# Patient Record
Sex: FEMALE | Race: OTHER | HISPANIC OR LATINO | URBAN - METROPOLITAN AREA
[De-identification: names, ages, dates, MRNs, and addresses within clinical notes are randomized per-mention and may not be internally consistent; named-entity substitution may affect disease eponyms.]

---

## 2024-07-11 ENCOUNTER — EMERGENCY (EMERGENCY)
Facility: HOSPITAL | Age: 29
LOS: 1 days | Discharge: HOME | End: 2024-07-11
Attending: EMERGENCY MEDICINE | Admitting: EMERGENCY MEDICINE
Payer: COMMERCIAL

## 2024-07-11 VITALS
OXYGEN SATURATION: 99 % | HEART RATE: 68 BPM | SYSTOLIC BLOOD PRESSURE: 115 MMHG | DIASTOLIC BLOOD PRESSURE: 68 MMHG | RESPIRATION RATE: 17 BRPM

## 2024-07-11 VITALS
TEMPERATURE: 98 F | OXYGEN SATURATION: 98 % | DIASTOLIC BLOOD PRESSURE: 85 MMHG | SYSTOLIC BLOOD PRESSURE: 137 MMHG | HEART RATE: 95 BPM | RESPIRATION RATE: 16 BRPM | WEIGHT: 169.98 LBS

## 2024-07-11 DIAGNOSIS — T78.1XXA OTHER ADVERSE FOOD REACTIONS, NOT ELSEWHERE CLASSIFIED, INITIAL ENCOUNTER: ICD-10-CM

## 2024-07-11 LAB — HCG UR QL: NEGATIVE — SIGNIFICANT CHANGE UP

## 2024-07-11 PROCEDURE — 99284 EMERGENCY DEPT VISIT MOD MDM: CPT

## 2024-07-11 RX ORDER — FAMOTIDINE 40 MG
20 TABLET ORAL ONCE
Refills: 0 | Status: COMPLETED | OUTPATIENT
Start: 2024-07-11 | End: 2024-07-11

## 2024-07-11 RX ORDER — DIPHENHYDRAMINE HCL 12.5MG/5ML
1 ELIXIR ORAL
Qty: 15 | Refills: 0
Start: 2024-07-11 | End: 2024-07-15

## 2024-07-11 RX ORDER — SODIUM CHLORIDE 0.9 % (FLUSH) 0.9 %
1000 SYRINGE (ML) INJECTION ONCE
Refills: 0 | Status: COMPLETED | OUTPATIENT
Start: 2024-07-11 | End: 2024-07-11

## 2024-07-11 RX ORDER — PREDNISONE 10 MG/1
2 TABLET ORAL
Qty: 8 | Refills: 0
Start: 2024-07-11 | End: 2024-07-14

## 2024-07-11 RX ADMIN — Medication 20 MILLIGRAM(S): at 15:54

## 2024-07-11 RX ADMIN — Medication 1000 MILLILITER(S): at 15:54

## 2024-07-18 ENCOUNTER — EMERGENCY (EMERGENCY)
Facility: HOSPITAL | Age: 29
LOS: 0 days | Discharge: HOME | End: 2024-07-19
Attending: EMERGENCY MEDICINE
Payer: COMMERCIAL

## 2024-07-18 VITALS
DIASTOLIC BLOOD PRESSURE: 72 MMHG | OXYGEN SATURATION: 100 % | HEART RATE: 85 BPM | SYSTOLIC BLOOD PRESSURE: 123 MMHG | RESPIRATION RATE: 18 BRPM

## 2024-07-18 VITALS
HEART RATE: 125 BPM | SYSTOLIC BLOOD PRESSURE: 122 MMHG | DIASTOLIC BLOOD PRESSURE: 76 MMHG | OXYGEN SATURATION: 99 % | RESPIRATION RATE: 17 BRPM | TEMPERATURE: 98 F

## 2024-07-18 DIAGNOSIS — F12.90 CANNABIS USE, UNSPECIFIED, UNCOMPLICATED: ICD-10-CM

## 2024-07-18 DIAGNOSIS — R11.2 NAUSEA WITH VOMITING, UNSPECIFIED: ICD-10-CM

## 2024-07-18 DIAGNOSIS — F41.9 ANXIETY DISORDER, UNSPECIFIED: ICD-10-CM

## 2024-07-18 DIAGNOSIS — R11.10 VOMITING, UNSPECIFIED: ICD-10-CM

## 2024-07-18 DIAGNOSIS — T40.715A ADVERSE EFFECT OF CANNABIS, INITIAL ENCOUNTER: ICD-10-CM

## 2024-07-18 LAB
ALBUMIN SERPL ELPH-MCNC: 4.7 G/DL — SIGNIFICANT CHANGE UP (ref 3.5–5.2)
ALP SERPL-CCNC: 66 U/L — SIGNIFICANT CHANGE UP (ref 30–115)
ALT FLD-CCNC: 12 U/L — SIGNIFICANT CHANGE UP (ref 0–41)
ANION GAP SERPL CALC-SCNC: 16 MMOL/L — HIGH (ref 7–14)
AST SERPL-CCNC: 12 U/L — SIGNIFICANT CHANGE UP (ref 0–41)
BILIRUB SERPL-MCNC: 0.5 MG/DL — SIGNIFICANT CHANGE UP (ref 0.2–1.2)
BUN SERPL-MCNC: 14 MG/DL — SIGNIFICANT CHANGE UP (ref 10–20)
CALCIUM SERPL-MCNC: 9.4 MG/DL — SIGNIFICANT CHANGE UP (ref 8.4–10.5)
CHLORIDE SERPL-SCNC: 101 MMOL/L — SIGNIFICANT CHANGE UP (ref 98–110)
CO2 SERPL-SCNC: 21 MMOL/L — SIGNIFICANT CHANGE UP (ref 17–32)
CREAT SERPL-MCNC: 0.8 MG/DL — SIGNIFICANT CHANGE UP (ref 0.7–1.5)
EGFR: 102 ML/MIN/1.73M2 — SIGNIFICANT CHANGE UP
GLUCOSE SERPL-MCNC: 134 MG/DL — HIGH (ref 70–99)
HCG SERPL QL: NEGATIVE — SIGNIFICANT CHANGE UP
HCT VFR BLD CALC: 42.3 % — SIGNIFICANT CHANGE UP (ref 37–47)
HGB BLD-MCNC: 14.2 G/DL — SIGNIFICANT CHANGE UP (ref 12–16)
LIDOCAIN IGE QN: 64 U/L — HIGH (ref 7–60)
MCHC RBC-ENTMCNC: 29.5 PG — SIGNIFICANT CHANGE UP (ref 27–31)
MCHC RBC-ENTMCNC: 33.6 G/DL — SIGNIFICANT CHANGE UP (ref 32–37)
MCV RBC AUTO: 87.8 FL — SIGNIFICANT CHANGE UP (ref 81–99)
PLATELET # BLD AUTO: 302 K/UL — SIGNIFICANT CHANGE UP (ref 130–400)
PMV BLD: 10.2 FL — SIGNIFICANT CHANGE UP (ref 7.4–10.4)
POTASSIUM SERPL-MCNC: 3.1 MMOL/L — LOW (ref 3.5–5)
POTASSIUM SERPL-SCNC: 3.1 MMOL/L — LOW (ref 3.5–5)
PROT SERPL-MCNC: 7.5 G/DL — SIGNIFICANT CHANGE UP (ref 6–8)
RBC # BLD: 4.82 M/UL — SIGNIFICANT CHANGE UP (ref 4.2–5.4)
RBC # FLD: 12.7 % — SIGNIFICANT CHANGE UP (ref 11.5–14.5)
SODIUM SERPL-SCNC: 138 MMOL/L — SIGNIFICANT CHANGE UP (ref 135–146)
WBC # BLD: 14.19 K/UL — HIGH (ref 4.8–10.8)
WBC # FLD AUTO: 14.19 K/UL — HIGH (ref 4.8–10.8)

## 2024-07-18 PROCEDURE — 85025 COMPLETE CBC W/AUTO DIFF WBC: CPT

## 2024-07-18 PROCEDURE — 36415 COLL VENOUS BLD VENIPUNCTURE: CPT

## 2024-07-18 PROCEDURE — 96374 THER/PROPH/DIAG INJ IV PUSH: CPT

## 2024-07-18 PROCEDURE — 83690 ASSAY OF LIPASE: CPT

## 2024-07-18 PROCEDURE — 84703 CHORIONIC GONADOTROPIN ASSAY: CPT

## 2024-07-18 PROCEDURE — 80053 COMPREHEN METABOLIC PANEL: CPT

## 2024-07-18 PROCEDURE — 96375 TX/PRO/DX INJ NEW DRUG ADDON: CPT

## 2024-07-18 PROCEDURE — 99284 EMERGENCY DEPT VISIT MOD MDM: CPT | Mod: 25

## 2024-07-18 PROCEDURE — 99284 EMERGENCY DEPT VISIT MOD MDM: CPT

## 2024-07-18 RX ORDER — SODIUM CHLORIDE 0.9 % (FLUSH) 0.9 %
1000 SYRINGE (ML) INJECTION ONCE
Refills: 0 | Status: COMPLETED | OUTPATIENT
Start: 2024-07-18 | End: 2024-07-18

## 2024-07-18 RX ORDER — FAMOTIDINE 40 MG
20 TABLET ORAL ONCE
Refills: 0 | Status: COMPLETED | OUTPATIENT
Start: 2024-07-18 | End: 2024-07-18

## 2024-07-18 RX ORDER — LORAZEPAM 0.5 MG
1 TABLET ORAL ONCE
Refills: 0 | Status: DISCONTINUED | OUTPATIENT
Start: 2024-07-18 | End: 2024-07-18

## 2024-07-18 RX ORDER — ONDANSETRON HYDROCHLORIDE 2 MG/ML
4 INJECTION INTRAMUSCULAR; INTRAVENOUS ONCE
Refills: 0 | Status: COMPLETED | OUTPATIENT
Start: 2024-07-18 | End: 2024-07-18

## 2024-07-18 RX ORDER — MAGNESIUM SULFATE 100 %
2 POWDER (GRAM) MISCELLANEOUS ONCE
Refills: 0 | Status: COMPLETED | OUTPATIENT
Start: 2024-07-18 | End: 2024-07-18

## 2024-07-18 RX ORDER — DIAZEPAM 10 MG/1
5 TABLET ORAL ONCE
Refills: 0 | Status: DISCONTINUED | OUTPATIENT
Start: 2024-07-18 | End: 2024-07-18

## 2024-07-18 RX ORDER — POTASSIUM CHLORIDE 600 MG/1
20 TABLET, FILM COATED, EXTENDED RELEASE ORAL ONCE
Refills: 0 | Status: DISCONTINUED | OUTPATIENT
Start: 2024-07-18 | End: 2024-07-19

## 2024-07-18 RX ADMIN — Medication 1 MILLIGRAM(S): at 23:54

## 2024-07-18 RX ADMIN — Medication 150 GRAM(S): at 23:54

## 2024-07-18 RX ADMIN — DIAZEPAM 5 MILLIGRAM(S): 10 TABLET ORAL at 23:02

## 2024-07-18 RX ADMIN — Medication 1000 MILLILITER(S): at 23:02

## 2024-07-18 RX ADMIN — ONDANSETRON HYDROCHLORIDE 4 MILLIGRAM(S): 2 INJECTION INTRAMUSCULAR; INTRAVENOUS at 23:01

## 2024-07-18 RX ADMIN — Medication 20 MILLIGRAM(S): at 23:01

## 2024-07-19 LAB
BASOPHILS # BLD AUTO: 0 K/UL — SIGNIFICANT CHANGE UP (ref 0–0.2)
BASOPHILS NFR BLD AUTO: 0 % — SIGNIFICANT CHANGE UP (ref 0–1)
EOSINOPHIL # BLD AUTO: 0.37 K/UL — SIGNIFICANT CHANGE UP (ref 0–0.7)
EOSINOPHIL NFR BLD AUTO: 2.6 % — SIGNIFICANT CHANGE UP (ref 0–8)
LYMPHOCYTES # BLD AUTO: 41.7 % — SIGNIFICANT CHANGE UP (ref 20.5–51.1)
LYMPHOCYTES # BLD AUTO: 5.92 K/UL — HIGH (ref 1.2–3.4)
MANUAL SMEAR VERIFICATION: SIGNIFICANT CHANGE UP
MONOCYTES # BLD AUTO: 0.87 K/UL — HIGH (ref 0.1–0.6)
MONOCYTES NFR BLD AUTO: 6.1 % — SIGNIFICANT CHANGE UP (ref 1.7–9.3)
NEUTROPHILS # BLD AUTO: 6.67 K/UL — HIGH (ref 1.4–6.5)
NEUTROPHILS NFR BLD AUTO: 47 % — SIGNIFICANT CHANGE UP (ref 42.2–75.2)
PLAT MORPH BLD: NORMAL — SIGNIFICANT CHANGE UP
POLYCHROMASIA BLD QL SMEAR: SLIGHT — SIGNIFICANT CHANGE UP
RBC BLD AUTO: ABNORMAL
VARIANT LYMPHS # BLD: 2.6 % — SIGNIFICANT CHANGE UP (ref 0–5)

## 2024-07-19 RX ORDER — POTASSIUM CHLORIDE 600 MG/1
40 TABLET, FILM COATED, EXTENDED RELEASE ORAL ONCE
Refills: 0 | Status: DISCONTINUED | OUTPATIENT
Start: 2024-07-19 | End: 2024-07-19

## 2024-09-19 ENCOUNTER — EMERGENCY (EMERGENCY)
Facility: HOSPITAL | Age: 29
LOS: 1 days | Discharge: HOME | End: 2024-09-19
Attending: EMERGENCY MEDICINE | Admitting: EMERGENCY MEDICINE
Payer: COMMERCIAL

## 2024-09-19 VITALS
OXYGEN SATURATION: 99 % | HEART RATE: 89 BPM | DIASTOLIC BLOOD PRESSURE: 78 MMHG | RESPIRATION RATE: 18 BRPM | TEMPERATURE: 98 F | SYSTOLIC BLOOD PRESSURE: 158 MMHG

## 2024-09-19 VITALS — RESPIRATION RATE: 18 BRPM | HEART RATE: 82 BPM | OXYGEN SATURATION: 96 %

## 2024-09-19 PROCEDURE — 99222 1ST HOSP IP/OBS MODERATE 55: CPT

## 2024-09-19 RX ORDER — SODIUM CHLORIDE 9 MG/ML
1000 INJECTION INTRAMUSCULAR; INTRAVENOUS; SUBCUTANEOUS ONCE
Refills: 0 | Status: COMPLETED | OUTPATIENT
Start: 2024-09-19 | End: 2024-09-19

## 2024-09-19 RX ORDER — PREDNISONE 10 MG
1 TABLET, DOSE PACK ORAL
Qty: 15 | Refills: 0
Start: 2024-09-19 | End: 2024-09-23

## 2024-09-19 RX ORDER — METHYLPREDNISOLONE 4 MG
125 TABLET ORAL ONCE
Refills: 0 | Status: COMPLETED | OUTPATIENT
Start: 2024-09-19 | End: 2024-09-19

## 2024-09-19 RX ORDER — FAMOTIDINE 10 MG/ML
20 INJECTION INTRAVENOUS ONCE
Refills: 0 | Status: COMPLETED | OUTPATIENT
Start: 2024-09-19 | End: 2024-09-19

## 2024-09-19 RX ADMIN — Medication 125 MILLIGRAM(S): at 18:24

## 2024-09-19 RX ADMIN — FAMOTIDINE 20 MILLIGRAM(S): 10 INJECTION INTRAVENOUS at 18:24

## 2024-09-19 RX ADMIN — SODIUM CHLORIDE 1000 MILLILITER(S): 9 INJECTION INTRAMUSCULAR; INTRAVENOUS; SUBCUTANEOUS at 18:24

## 2024-09-19 NOTE — ED CDU PROVIDER INITIAL DAY NOTE - PROGRESS NOTE DETAILS
Patient feels better.  She does not want to stay for further observation.  She wants to go home.  She does have an Epi-Pen on her person and can use it if symptoms return.  Pt also states that she lives near a hospital if symptoms return.  I will d/c home.

## 2024-09-19 NOTE — ED CDU PROVIDER DISPOSITION NOTE - CLINICAL COURSE
Pt had anaphylaxis.  Pre-hospital EMS crew gave Epinephrine and Benadryl.  No recurrence of symptoms in the ED.  Patient discharged.  Has Epi-Pen on her person.

## 2024-09-19 NOTE — ED PROVIDER NOTE - CLINICAL SUMMARY MEDICAL DECISION MAKING FREE TEXT BOX
Pt had anaphylaxis prior to ED arrival.  EMS gave Epi-Pen and IV Benadryl  Normal exam--no wheezing and no hives.  No tongue/lip swelling.  Will place on observation in case rebound after epinephrine wears also  Will also give IV fluids and steroids/Pepcid

## 2024-09-19 NOTE — ED CDU PROVIDER DISPOSITION NOTE - DISCHARGE DATE
19-Sep-2024 Patient with hx of chronic hyponatremia. Na 124, previously due to polydipsia. Also recently on bactrim, now discontinued.   S/p 1 L NS in ED  - will fluid restrict to 1.5 L for now given TASH  - f/u urine studies  - f/u AM Na Patient with hx of chronic hyponatremia. Na 124, previously due to polydipsia. Also recently on bactrim, now discontinued. S/p 1 L NS in ED. Na today 127, and patient reports poor PO intake over the past week. Uosm 342, Farrukh <20.  - Continue to monitor  - NS @100cc/hr x10h ordered today

## 2024-09-19 NOTE — ED ADULT NURSE NOTE - CHPI ED NUR SYMPTOMS NEG
no congestion/no difficulty breathing/no rash/no shortness of breath/no swelling of face, tongue/no throat itching/no wheezing

## 2024-09-19 NOTE — ED PROVIDER NOTE - PHYSICAL EXAMINATION
General: Patient is A&O x 3 in NAD  Head: NC/AT;  Eyes: EOMI, no lid lag, no proptosis  Ears: Normal  Nose: Normal  Neck: Normal ROM  Lungs: Normal respiratory effort  Heart: Normal rate, no peripheral edema  Neuro: Gait normal, nonfocal  Skin: No rash, lesions or ulcers  Psych: Normal affect and behavior, intact judgement and insight

## 2024-09-19 NOTE — ED CDU PROVIDER DISPOSITION NOTE - NSFOLLOWUPINSTRUCTIONS_ED_ALL_ED_FT
Thank you for letting us take care of you today.  Return to the Emergency Department if your symptoms worsen, or if any problems.    Take the medication as prescribed.    If your symptoms return, please use your Epi-Pen.    Follow up with your primary care physician as soon as possible for a re-evaluation.    Anaphylactic Reaction, Adult  An anaphylactic reaction is a sudden and very bad allergic reaction. It must be treated right away.    What are the causes?  Being exposed to things you are allergic to (allergens). These may be:  Foods, like peanuts, wheat, shellfish, milk, or eggs.  Medicines.  Insect bites or stings.  Blood or parts of blood that have been given to you for treatment (transfusions).  Chemicals. These include latex and dyes used in food and medical tests.  What increases the risk?  Having allergies.  Having had this kind of reaction before.  Having a family history of this kind of reaction.  Having asthma or eczema.  What are the signs or symptoms?  Feeling warm in the face (flushed). Your face may turn red.  Hives. These are itchy, red, swollen areas of skin.  Swelling of the eyes, lips, face, mouth, tongue, or throat.  Trouble breathing or speaking.  Trouble swallowing.  Feeling dizzy or fainting.  Pain or cramps in your belly (abdomen).  Vomiting or watery poop (diarrhea).  How is this treated?  A person using an auto-injector pen in the thigh.  If you are having a bad allergic reaction, you should:  Give yourself a shot using a device filled with epinephrine (auto-injector pen). Your doctor will teach you how to use the pen.  Call for help. If you use an auto-injector pen, you must still get treated in the hospital. You may be given:  Medicines.  Oxygen.  Fluids in an IV tube.  Follow these instructions at home:  Safety    Always keep an auto-injector pen with you. If you can, carry two pens. Use the pen as told by your doctor. After you use the pen, get more medicine for it right away.  Do not drive after you have a reaction. Wait until your doctor says it is safe to drive.  Make sure that you, the people who live with you, and your employer know:  What you are allergic to.  How to use your auto-injector pen.  If told by your doctor, wear a bracelet or necklace that says you have an allergy.  Learn the signs of a very bad allergic reaction.  Work with your doctors to make a plan for what to do if you have a very bad reaction.  General instructions    Take over-the-counter and prescription medicines only as told by your doctor.  If you have a rash or itchy skin:  Use an allergy medicine as told by your doctor.  Put cold, wet cloths on your skin.  Take a cool bath or shower. Do not use hot water.  Tell all of your doctors that you have an allergy.  How is this prevented?  Avoid things that have given you a bad reaction before.  Tell your  about your allergy when you go out to eat. If you are not sure if your meal contains food that you are allergic to, ask your  before you eat it.  Where to find more information  American Academy of Allergy, Asthma, and Immunology (AAAAI): aaaai.org  Contact a doctor if:  Your auto-injector pen is .  Get help right away if:  You have a bad allergic reaction.  You use your auto-injector pen. You must go to the hospital even if the medicine seems to be working.  These symptoms may be an emergency. Use the auto-injector pen right away. Then call 911.  Do not wait to see if the symptoms will go away.  Do not drive yourself to the hospital.  This information is not intended to replace advice given to you by your health care provider. Make sure you discuss any questions you have with your health care provider.

## 2024-09-19 NOTE — ED ADULT NURSE REASSESSMENT NOTE - NS ED NURSE REASSESS COMMENT FT1
pt feels better, requesting d/c. MD Delgadillo aware. Pt educated to use epi-pen if symptoms return. IV removed and provided w/ d/c papers.

## 2024-09-19 NOTE — ED CDU PROVIDER INITIAL DAY NOTE - DETAILS
Received Epi-Pen for anaphylaxis  I placed in Observation because will need to observe for several hours to see if rebound once Epinephrine wears off.

## 2024-09-19 NOTE — ED CDU PROVIDER DISPOSITION NOTE - PATIENT PORTAL LINK FT
You can access the FollowMyHealth Patient Portal offered by [facility] by registering at the following website: [email or web address]. By joining [employer]’s FollowMyHealth portal, you will also be able to view your health information using other applications (apps) compatible with our system.

## 2024-09-19 NOTE — ED ADULT TRIAGE NOTE - CHIEF COMPLAINT QUOTE
PT BIBEMS for allergic reaction s/p eating passion fruit donut. Pt given 50mg of benadryl IV and epi IM by EMS prior to arrival. Pt arrives with 20 g to left arm.

## 2024-09-19 NOTE — ED PROVIDER NOTE - OBJECTIVE STATEMENT
Pt is a pleasant 29 yr old female who states that she was at work and had some passion fruit, sushi and then she threw up and then she went to catch her train and she felt her throat close up and tighten up.  She then "freaked out" and she found a  who called 911.  EMS gave 0.3mg of epinephrine and also 50 mg benadryl IV push.  She felt better after medicated by EMS.  No SOB.  Currently only complaint is dry mouth.  Pt had a similar event a month ago after eating -- had lip/tongue swelling and received EpiPen.    PMH: Anaphylaxis, Anxiety, Depression  Meds: anxiety/depression meds  Social: No tobacco, occasional ETOH, used to use THC  Allergies: NKDA  PCP: Gentry Lackey

## 2024-09-19 NOTE — ED ADULT NURSE NOTE - OBJECTIVE STATEMENT
Pt is a 29y female c/o allergic rxn. Reports waiting for the train while eating a passion fruit donut and suddenly feel throat swelling w/ diff swallowing w/ 1 episode of vomiting. Given 50mg IVP benadryl and epi IM by EMS PTA. 20G L hand intact by EMS. Reports improvement of symptoms on exam. Denies swelling, SOB, hives, itching. Reports 1 previous similar episode, denies known allergies.

## 2024-09-20 PROBLEM — Z78.9 OTHER SPECIFIED HEALTH STATUS: Chronic | Status: ACTIVE | Noted: 2024-07-18

## 2024-09-22 DIAGNOSIS — R11.10 VOMITING, UNSPECIFIED: ICD-10-CM
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
## 2024-09-22 DIAGNOSIS — T78.09XA ANAPHYLACTIC REACTION DUE TO OTHER FOOD PRODUCTS, INITIAL ENCOUNTER: ICD-10-CM
